# Patient Record
Sex: FEMALE | Race: OTHER | Employment: UNEMPLOYED | URBAN - METROPOLITAN AREA
[De-identification: names, ages, dates, MRNs, and addresses within clinical notes are randomized per-mention and may not be internally consistent; named-entity substitution may affect disease eponyms.]

---

## 2024-11-13 ENCOUNTER — HOSPITAL ENCOUNTER (EMERGENCY)
Facility: HOSPITAL | Age: 29
Discharge: LEFT BEFORE DISCHARGE | End: 2024-11-13
Attending: EMERGENCY MEDICINE
Payer: COMMERCIAL

## 2024-11-13 DIAGNOSIS — R10.9 FLANK PAIN: Primary | ICD-10-CM

## 2024-11-13 PROCEDURE — 99282 EMERGENCY DEPT VISIT SF MDM: CPT

## 2024-11-13 ASSESSMENT — ENCOUNTER SYMPTOMS
EYE PAIN: 0
NAUSEA: 0
COUGH: 0
SHORTNESS OF BREATH: 0
ABDOMINAL PAIN: 0
DIARRHEA: 0
SORE THROAT: 0
VOMITING: 0

## 2024-11-13 NOTE — ED NOTES
Unable to finish pt triage. LAVINIA Ruano present during triage. Pt needs stent removed, PA explained that pt needs to see urology, offered pt treatment, pt declined. Pt given phone number to [facility] and urged to call ASAP. Pt verbalized understanding of urgency.      # [id number] used for AMA form explanation.

## 2024-11-13 NOTE — ED PROVIDER NOTES
Non-plain film images such as CT, Ultrasound and MRI are read by the radiologist. Plain radiographic images are visualized and preliminarily interpreted by the emergency physician with the below findings:        Interpretation per the Radiologist below, if available at the time of this note:    No orders to display        LABS:  Labs Reviewed - No data to display    All other labs were within normal range or not returned as of this dictation.    EMERGENCY DEPARTMENT COURSE and DIFFERENTIAL DIAGNOSIS/MDM:   Vitals:  There were no vitals filed for this visit.        Medical Decision Making  29-year-old female presents ED with flank pain.  Physical exam notable for CVA tenderness to palpation but no abdominal tenderness to palpation.  Given these findings with no medical history in her chart I did inform patient that I would like to do a urine, get a try CT scan and treat her pain while here and then we could coordinate with urology to establish follow-up.  Patient verbalized that she was hoping to get the stent removed today and be seen by urologist today.  Informed patient that we do not have any urologist location but I was able to communicate with the urologist to set up outpatient care and get their perspective based off the results.  Patient reported \"if I cannot get the stent removed today then I do not want to stay\".  I encouraged patient to stay as I was concerned about her continued flank pain and wanted to make sure she did not have a concurrent infection or other complication.  I also encouraged her that her outpatient care would likely be expedited if we were able to communicate with urologist while she was here.  She verbalized understanding but again reported that she would like to leave without any further testing or treatment done.  I did have patient's sign AMA form but will discharge with instructions for outpatient follow-up with urology.            REASSESSMENT

## 2024-11-13 NOTE — DISCHARGE INSTRUCTIONS
Llame a la línea directa de cálculos renales las 24 horas, los 7 días de la semana de [facility]. [phone]  [place] los medicamentos nuevos según lo recetado.  Brittanie al menos 100 onzas de agua por día.  Continúe monitoreando los síntomas y regrese si hay cambios o empeoramiento.

## 2024-11-13 NOTE — ED TRIAGE NOTES
Pt triaged with  # [id number]    Pt arrives with cc right flank pain that started last night. Pt is here to have the stent removed.   Pt had surgery in [place] last Wednesday, pt had kidney stone removal with a stent placement.